# Patient Record
Sex: MALE | Race: WHITE | NOT HISPANIC OR LATINO | Employment: OTHER | URBAN - METROPOLITAN AREA
[De-identification: names, ages, dates, MRNs, and addresses within clinical notes are randomized per-mention and may not be internally consistent; named-entity substitution may affect disease eponyms.]

---

## 2017-12-19 ENCOUNTER — HOSPITAL ENCOUNTER (OUTPATIENT)
Dept: GENERAL RADIOLOGY | Facility: HOSPITAL | Age: 57
Discharge: HOME | End: 2017-12-19
Attending: FAMILY MEDICINE | Admitting: FAMILY MEDICINE

## 2023-03-16 ENCOUNTER — HOSPITAL ENCOUNTER (OUTPATIENT)
Facility: HOSPITAL | Age: 63
Discharge: HOME | End: 2023-03-16
Attending: EMERGENCY MEDICINE
Payer: MEDICAID

## 2023-03-16 VITALS
OXYGEN SATURATION: 93 % | BODY MASS INDEX: 36.45 KG/M2 | DIASTOLIC BLOOD PRESSURE: 77 MMHG | HEIGHT: 78 IN | SYSTOLIC BLOOD PRESSURE: 127 MMHG | RESPIRATION RATE: 14 BRPM | WEIGHT: 315 LBS | TEMPERATURE: 97.6 F | HEART RATE: 86 BPM

## 2023-03-16 DIAGNOSIS — S69.90XA FISH HOOK IN HAND: Primary | ICD-10-CM

## 2023-03-16 PROCEDURE — 90715 TDAP VACCINE 7 YRS/> IM: CPT | Performed by: EMERGENCY MEDICINE

## 2023-03-16 PROCEDURE — 10120 INC&RMVL FB SUBQ TISS SMPL: CPT | Performed by: EMERGENCY MEDICINE

## 2023-03-16 PROCEDURE — G0463 HOSPITAL OUTPT CLINIC VISIT: HCPCS | Performed by: EMERGENCY MEDICINE

## 2023-03-16 PROCEDURE — 90471 IMMUNIZATION ADMIN: CPT | Performed by: EMERGENCY MEDICINE

## 2023-03-16 PROCEDURE — 25010000002 TETANUS-DIPHTH-ACELL PERTUSSIS 5-2.5-18.5 LF-MCG/0.5 SUSPENSION PREFILLED SYRINGE: Performed by: EMERGENCY MEDICINE

## 2023-03-16 PROCEDURE — 99203 OFFICE O/P NEW LOW 30 MIN: CPT | Performed by: EMERGENCY MEDICINE

## 2023-03-16 RX ADMIN — TETANUS TOXOID, REDUCED DIPHTHERIA TOXOID AND ACELLULAR PERTUSSIS VACCINE, ADSORBED 0.5 ML: 5; 2.5; 8; 8; 2.5 SUSPENSION INTRAMUSCULAR at 16:27

## 2023-03-16 NOTE — [facility] PROVIDER NOTE
Subjective   History of Present Illness  The patient is a 62-year-old male, who presents emergency room with complaints of a fishhook in his hand.  Patient states he was fishing this afternoon at noon when he got a fishhook stuck in the dorsal aspect of his right hand.  He reports that it was a new hook.  It had not been introduced to the water.  He states that he finished his fishing for the day and then decided to come in afterwards.  He was unable to remove it at home.        Review of Systems   Constitutional: Negative.  Negative for chills, fatigue and fever.   Eyes: Negative.    Respiratory: Negative for cough, chest tightness and shortness of breath.    Cardiovascular: Negative for chest pain and palpitations.   Gastrointestinal: Negative for abdominal pain, diarrhea, nausea and vomiting.   Genitourinary: Negative.    Musculoskeletal: Negative.    Skin: Positive for wound. Negative for rash.   Neurological: Negative.  Negative for syncope, weakness, numbness and headaches.   Psychiatric/Behavioral: Negative.    All other systems reviewed and are negative.      No past medical history on file.    No Known Allergies    No past surgical history on file.    No family history on file.    Social History     Socioeconomic History   • Marital status:            Objective   Physical Exam  Vitals and nursing note reviewed.   Constitutional:       General: He is not in acute distress.     Appearance: He is not ill-appearing.   HENT:      Head: Normocephalic.      Right Ear: External ear normal.      Left Ear: External ear normal.      Nose: Nose normal.      Mouth/Throat:      Mouth: Mucous membranes are moist.   Eyes:      Extraocular Movements: Extraocular movements intact.   Cardiovascular:      Rate and Rhythm: Normal rate and regular rhythm.      Pulses: Normal pulses.   Pulmonary:      Effort: Pulmonary effort is normal. No respiratory distress.   Abdominal:      General: Abdomen is flat.   Musculoskeletal:          General: No swelling, tenderness, deformity or signs of injury. Normal range of motion.      Cervical back: No tenderness.      Comments: [place] right hand, see picture below.   Skin:     General: Skin is warm.      Capillary Refill: Capillary refill takes less than 2 seconds.   Neurological:      General: No focal deficit present.      Mental Status: He is alert and oriented to person, place, and time. Mental status is at baseline.   Psychiatric:         Mood and Affect: Mood normal.         Foreign Body Removal - Embedded    Date/Time: 3/16/2023 4:36 PM  Performed by: Edison Mixon MD  Authorized by: Edison Mixon MD     Consent:     Consent obtained:  Verbal    Risks discussed:  Bleeding    Alternatives discussed:  No treatment  Universal protocol:     Patient identity confirmed:  Verbally with patient  Location:     Location:  Hand    Hand location:  R hand dorsum    Depth:  Intradermal    Tendon involvement:  None  Pre-procedure details:     Imaging:  None    Neurovascular status: intact      Preparation: Patient was prepped and draped in usual sterile fashion    Anesthesia:     Anesthesia method:  Local infiltration    Local anesthetic:  Lidocaine 1% WITH epi  Procedure type:     Procedure complexity:  Simple  Procedure details:     Localization method:  Visualized    Dissection of underlying tissues: no      Bloodless field: no      Removal mechanism:  Hemostat    Foreign bodies recovered:  1    Intact foreign body removal: yes    Post-procedure details:     Neurovascular status: intact      Skin closure:  None    Dressing:  Antibiotic ointment    Procedure completion:  Tolerated well, no immediate complications               ED Course                                           Medical Decision Making  HemePatient has a fishhook in the dorsal aspect of his right hand that I have removed using stats and a .    Fish hook in hand: complicated acute illness or  injury  Risk  Prescription drug management.          Final diagnoses:   Fish hook in hand       ED Disposition  ED Disposition     ED Disposition   Discharge    Condition   Stable    Comment   --             Milly Chery, APRN  [address]  [address] [place] [address] [phone]    Schedule an appointment as soon as possible for a visit   As needed         Medication List      No changes were made to your prescriptions during this visit.

## 2023-03-16 NOTE — DISCHARGE INSTRUCTIONS
Dressed with antibiotic ointment daily.  Return to the emergency room for any signs of infection.  Tylenol as needed for pain.